# Patient Record
Sex: FEMALE | Race: WHITE | HISPANIC OR LATINO | Employment: FULL TIME | URBAN - METROPOLITAN AREA
[De-identification: names, ages, dates, MRNs, and addresses within clinical notes are randomized per-mention and may not be internally consistent; named-entity substitution may affect disease eponyms.]

---

## 2024-03-04 ENCOUNTER — HOSPITAL ENCOUNTER (EMERGENCY)
Facility: HOSPITAL | Age: 24
Discharge: HOME | End: 2024-03-04
Attending: EMERGENCY MEDICINE
Payer: COMMERCIAL

## 2024-03-04 VITALS
HEART RATE: 93 BPM | DIASTOLIC BLOOD PRESSURE: 78 MMHG | OXYGEN SATURATION: 99 % | WEIGHT: 177.56 LBS | TEMPERATURE: 98 F | HEIGHT: 61 IN | BODY MASS INDEX: 33.52 KG/M2 | RESPIRATION RATE: 18 BRPM | SYSTOLIC BLOOD PRESSURE: 148 MMHG

## 2024-03-04 DIAGNOSIS — H60.503 ACUTE OTITIS EXTERNA OF BOTH EARS, UNSPECIFIED TYPE: Primary | ICD-10-CM

## 2024-03-04 DIAGNOSIS — H66.93 BILATERAL OTITIS MEDIA, UNSPECIFIED OTITIS MEDIA TYPE: ICD-10-CM

## 2024-03-04 PROCEDURE — 99284 EMERGENCY DEPT VISIT MOD MDM: CPT

## 2024-03-04 RX ORDER — NEOMYCIN SULFATE, POLYMYXIN B SULFATE, HYDROCORTISONE 3.5; 10000; 1 MG/ML; [USP'U]/ML; MG/ML
4 SOLUTION/ DROPS AURICULAR (OTIC) 4 TIMES DAILY
Qty: 10 ML | Refills: 0 | Status: SHIPPED | OUTPATIENT
Start: 2024-03-04 | End: 2024-03-11

## 2024-03-04 RX ORDER — AMOXICILLIN 875 MG/1
875 TABLET, FILM COATED ORAL 2 TIMES DAILY
Qty: 14 TABLET | Refills: 0 | Status: SHIPPED | OUTPATIENT
Start: 2024-03-04 | End: 2024-03-11

## 2024-03-04 NOTE — Clinical Note
"Yanci Kothari" Haja Hidalgo was seen and treated in our emergency department on 3/4/2024.  She may return to work on 03/05/2024.       If you have any questions or concerns, please don't hesitate to call.      Rod Sprague, NP"

## 2024-03-04 NOTE — ED PROVIDER NOTES
Encounter Date: 3/4/2024       History     Chief Complaint   Patient presents with    Otalgia     L sided ear pain x 2 days. Noticed mild bleeding in ear last night.      The history is provided by the patient. No  was used.   Otalgia  This is a new problem. The current episode started two days ago. There is pain in both ears. The problem occurs constantly. The problem has been unchanged. The pain is at a severity of 4/10. Associated symptoms include ear discharge. Pertinent negatives include no headaches, no hearing loss, no rhinorrhea, no sore throat, no abdominal pain, no diarrhea, no vomiting, no neck pain, no cough and no rash.     Review of patient's allergies indicates:  No Known Allergies  No past medical history on file.  No past surgical history on file.  No family history on file.     Review of Systems   Constitutional:  Negative for fever.   HENT:  Positive for ear discharge and ear pain. Negative for hearing loss, rhinorrhea and sore throat.    Respiratory:  Negative for cough and shortness of breath.    Cardiovascular:  Negative for chest pain.   Gastrointestinal:  Negative for abdominal pain, diarrhea, nausea and vomiting.   Genitourinary:  Negative for dysuria.   Musculoskeletal:  Negative for back pain and neck pain.   Skin:  Negative for rash.   Neurological:  Negative for weakness and headaches.   Hematological:  Does not bruise/bleed easily.       Physical Exam     Initial Vitals [03/04/24 0851]   BP Pulse Resp Temp SpO2   (!) 148/78 93 18 98.1 °F (36.7 °C) 99 %      MAP       --         Physical Exam    Nursing note and vitals reviewed.  Constitutional: She appears well-developed and well-nourished. She is not diaphoretic. No distress.   HENT:   Head: Normocephalic and atraumatic.   Right Ear: There is drainage and tenderness. Tympanic membrane is erythematous.   Left Ear: There is drainage and tenderness. Tympanic membrane is erythematous.   Eyes: Right eye exhibits no  discharge. Left eye exhibits no discharge.   Neck: Neck supple.   Normal range of motion.  Cardiovascular:  Normal rate.           Pulmonary/Chest: No respiratory distress.   Abdominal: Abdomen is soft. She exhibits no distension.   Musculoskeletal:         General: Normal range of motion.      Cervical back: Normal range of motion and neck supple.     Neurological: She is alert and oriented to person, place, and time. She has normal strength.   Skin: Skin is warm and dry.   Psychiatric: She has a normal mood and affect. Her behavior is normal. Thought content normal.         ED Course   Procedures  Labs Reviewed - No data to display       Imaging Results    None          Medications - No data to display  Medical Decision Making  Differential diagnosis  Viral otitis media, bacterial otitis media, otitis externa    Amount and/or Complexity of Data Reviewed  Discussion of management or test interpretation with external provider(s): Patient follow up with ENT as needed and return to the ER for any worsening or concerns    Risk  Prescription drug management.                                      Clinical Impression:  Final diagnoses:  [H60.503] Acute otitis externa of both ears, unspecified type (Primary)  [H66.93] Bilateral otitis media, unspecified otitis media type          ED Disposition Condition    Discharge Stable          ED Prescriptions       Medication Sig Dispense Start Date End Date Auth. Provider    neomycin-polymyxin-hydrocortisone (CORTISPORIN) otic solution Place 4 drops into both ears 4 (four) times daily. for 7 days 10 mL 3/4/2024 3/11/2024 Rod Sprague, NP    amoxicillin (AMOXIL) 875 MG tablet Take 1 tablet (875 mg total) by mouth 2 (two) times daily. for 7 days 14 tablet 3/4/2024 3/11/2024 Rod Sprague, CYN          Follow-up Information       Follow up With Specialties Details Why Contact Info    Otolaryngology Otolaryngology  As needed [address] [facility]  [place] [address]  [phone]    [facility]. Emergency Medicine  If symptoms worsen [id number]  [facility] [address] [phone]             Rod Sprague, CYN  03/04/24 155

## 2024-03-04 NOTE — ED NOTES
Patient examined, evaluated, and then educated on discharge instructions and prescriptions by CYN Sprague without medic assistance. Patient discharged to lobby by CYN Sprague.